# Patient Record
Sex: FEMALE | Race: WHITE | Employment: PART TIME | URBAN - METROPOLITAN AREA
[De-identification: names, ages, dates, MRNs, and addresses within clinical notes are randomized per-mention and may not be internally consistent; named-entity substitution may affect disease eponyms.]

---

## 2017-03-27 ENCOUNTER — HOSPITAL ENCOUNTER (OUTPATIENT)
Dept: WOMENS IMAGING | Age: 62
Discharge: HOME | End: 2017-03-27
Payer: MEDICARE

## 2017-03-27 DIAGNOSIS — Z12.39 BREAST SCREENING: ICD-10-CM

## 2017-03-27 PROCEDURE — G0202 SCR MAMMO BI INCL CAD: HCPCS

## 2018-04-23 PROBLEM — Z91.89 GYN EXAM FOR HIGH-RISK MEDICARE PATIENT: Status: ACTIVE | Noted: 2018-04-23

## 2018-04-23 PROBLEM — Z01.419 ENCOUNTER FOR ROUTINE GYNECOLOGIC EXAMINATION IN MEDICARE PATIENT: Status: ACTIVE | Noted: 2018-04-23

## 2018-06-25 ENCOUNTER — OFFICE VISIT (OUTPATIENT)
Dept: OBGYN CLINIC | Age: 63
End: 2018-06-25
Payer: MEDICARE

## 2018-06-25 VITALS
SYSTOLIC BLOOD PRESSURE: 108 MMHG | BODY MASS INDEX: 26.75 KG/M2 | HEIGHT: 63 IN | DIASTOLIC BLOOD PRESSURE: 64 MMHG | HEART RATE: 68 BPM | WEIGHT: 151 LBS

## 2018-06-25 DIAGNOSIS — Z90.710 H/O: HYSTERECTOMY: ICD-10-CM

## 2018-06-25 DIAGNOSIS — R87.612 LGSIL ON PAP SMEAR OF CERVIX: Primary | ICD-10-CM

## 2018-06-25 DIAGNOSIS — R87.810 CERVICAL HIGH RISK HPV (HUMAN PAPILLOMAVIRUS) TEST POSITIVE: ICD-10-CM

## 2018-06-25 DIAGNOSIS — Z90.710 HISTORY OF HYSTERECTOMY: ICD-10-CM

## 2018-06-25 PROCEDURE — 57452 EXAM OF CERVIX W/SCOPE: CPT | Performed by: OBSTETRICS & GYNECOLOGY

## 2018-11-15 PROBLEM — R31.21 ASYMPTOMATIC MICROSCOPIC HEMATURIA: Status: ACTIVE | Noted: 2018-11-15

## 2018-11-15 PROBLEM — N39.0 FREQUENT UTI: Status: ACTIVE | Noted: 2018-11-15

## 2019-06-06 PROBLEM — M15.9 PRIMARY OSTEOARTHRITIS INVOLVING MULTIPLE JOINTS: Status: ACTIVE | Noted: 2019-06-06

## 2019-06-06 PROBLEM — M15.0 PRIMARY OSTEOARTHRITIS INVOLVING MULTIPLE JOINTS: Status: ACTIVE | Noted: 2019-06-06

## 2019-06-06 PROBLEM — K59.01 SLOW TRANSIT CONSTIPATION: Status: ACTIVE | Noted: 2019-06-06

## 2020-11-03 PROBLEM — D62 ACUTE BLOOD LOSS ANEMIA: Status: ACTIVE | Noted: 2020-06-09

## 2020-11-03 PROBLEM — K92.2 GI BLEED: Status: ACTIVE | Noted: 2020-06-08

## 2020-11-03 PROBLEM — K92.2 GI BLEED: Status: RESOLVED | Noted: 2020-06-08 | Resolved: 2020-11-03

## 2021-05-06 PROBLEM — G89.29 CHRONIC LOW BACK PAIN: Status: ACTIVE | Noted: 2021-05-06

## 2021-05-06 PROBLEM — M54.50 CHRONIC LOW BACK PAIN: Status: ACTIVE | Noted: 2021-05-06

## 2024-10-22 ENCOUNTER — OFFICE VISIT (OUTPATIENT)
Dept: FAMILY MEDICINE CLINIC | Age: 69
End: 2024-10-22

## 2024-10-22 VITALS
DIASTOLIC BLOOD PRESSURE: 78 MMHG | SYSTOLIC BLOOD PRESSURE: 146 MMHG | BODY MASS INDEX: 27.28 KG/M2 | WEIGHT: 154 LBS | HEART RATE: 69 BPM | OXYGEN SATURATION: 96 % | TEMPERATURE: 97.9 F

## 2024-10-22 DIAGNOSIS — J01.90 ACUTE BACTERIAL SINUSITIS: Primary | ICD-10-CM

## 2024-10-22 DIAGNOSIS — B96.89 ACUTE BACTERIAL SINUSITIS: Primary | ICD-10-CM

## 2024-10-22 RX ORDER — BENZONATATE 100 MG/1
100 CAPSULE ORAL 3 TIMES DAILY PRN
Qty: 21 CAPSULE | Refills: 0 | Status: SHIPPED | OUTPATIENT
Start: 2024-10-22 | End: 2024-10-29

## 2024-10-22 RX ORDER — FLUTICASONE PROPIONATE 50 MCG
2 SPRAY, SUSPENSION (ML) NASAL DAILY
Qty: 16 G | Refills: 0 | Status: SHIPPED | OUTPATIENT
Start: 2024-10-22

## 2024-10-22 ASSESSMENT — ENCOUNTER SYMPTOMS
CHOKING: 0
VOICE CHANGE: 0
WHEEZING: 0
SHORTNESS OF BREATH: 0
SORE THROAT: 1
SINUS PAIN: 1
COUGH: 1
SWOLLEN GLANDS: 0
CHEST TIGHTNESS: 0
HOARSE VOICE: 0
STRIDOR: 0
SINUS PRESSURE: 1
TROUBLE SWALLOWING: 0

## 2024-10-22 NOTE — PROGRESS NOTES
[facility] PHYSICIANS [facility], [facility] W[facility]IN FAMILY [facility]IC[facility]  [address]  [address]  [facility] [address]  Dept: [phone]  Dept Fax: [phone]    Sheryl Mohr is a 69 y.o. female who presents to the urgent care today for her medical conditions/complaints as notedbelow.  Sheryl Mohr is c/o of Cough (Cough, worse in the morning, headache; sx started 2 weeks ago)      HPI:     69 yr old female works at a , presents for cough (mostly in morning), headaches, sinus congestion 2 weeks  Blowing green from nose  Cough productive morning only  Ears congested.   Feels like sinus infection sx. Can feel drainage going down throat, causing cough, sx not in her chest    Sinusitis  This is a new problem. The current episode started 1 to 4 weeks ago (2 weeks). The problem has been gradually worsening since onset. There has been no fever. Her pain is at a severity of 1/10. The pain is mild. Associated symptoms include congestion, coughing, ear pain, headaches, sinus pressure and a sore throat (scratchy). Pertinent negatives include no chills, diaphoresis, hoarse voice, neck pain, shortness of breath, sneezing or swollen glands. Past treatments include acetaminophen (sudafed, tylenol cold and flu). The treatment provided mild relief.       Past Medical History:   Diagnosis Date    Chronic low back pain 5/6/2021    Frequent UTI 11/15/2018    History of appendectomy 1969    History of hysterectomy, supracervical 2000    on jawline 2005    Osteopenia 3/11/2016    Patient to be started on supplemental vitamin D per PCP Patient counseled on daily exercise to strengthen bones and to be compliant with vitamin D intake     Primary osteoarthritis involving multiple joints 6/6/2019    Slow transit constipation 6/6/2019    Stomach ulcer 2020    bleeding required blood transfusion        Current Outpatient Medications   Medication Sig Dispense Refill    benzonatate (TESSALON PERLES)

## 2025-08-06 ENCOUNTER — HOSPITAL ENCOUNTER (OUTPATIENT)
Dept: PAIN MANAGEMENT | Age: 70
Discharge: HOME | End: 2025-08-06
Payer: MEDICARE

## 2025-08-06 VITALS — WEIGHT: 164 LBS | BODY MASS INDEX: 29.06 KG/M2 | HEIGHT: 63 IN

## 2025-08-06 DIAGNOSIS — M47.817 LUMBOSACRAL SPONDYLOSIS WITHOUT MYELOPATHY: Primary | ICD-10-CM

## 2025-08-06 DIAGNOSIS — M51.369 DEGENERATION OF INTERVERTEBRAL DISC OF LUMBAR REGION, UNSPECIFIED WHETHER PAIN PRESENT: ICD-10-CM

## 2025-08-06 PROCEDURE — 99204 OFFICE O/P NEW MOD 45 MIN: CPT | Performed by: STUDENT IN AN ORGANIZED HEALTH CARE EDUCATION/TRAINING PROGRAM

## 2025-08-06 PROCEDURE — 99203 OFFICE O/P NEW LOW 30 MIN: CPT

## 2025-08-06 RX ORDER — ACETAMINOPHEN 500 MG
500 TABLET ORAL EVERY 6 HOURS PRN
COMMUNITY